# Patient Record
Sex: FEMALE | Race: AMERICAN INDIAN OR ALASKA NATIVE
[De-identification: names, ages, dates, MRNs, and addresses within clinical notes are randomized per-mention and may not be internally consistent; named-entity substitution may affect disease eponyms.]

---

## 2017-03-26 ENCOUNTER — HOSPITAL ENCOUNTER (OUTPATIENT)
Dept: HOSPITAL 17 - NEPC | Age: 52
Setting detail: OBSERVATION
Discharge: HOME | End: 2017-03-26
Attending: INTERNAL MEDICINE | Admitting: INTERNAL MEDICINE
Payer: COMMERCIAL

## 2017-03-26 VITALS
DIASTOLIC BLOOD PRESSURE: 81 MMHG | RESPIRATION RATE: 21 BRPM | HEART RATE: 85 BPM | SYSTOLIC BLOOD PRESSURE: 156 MMHG | TEMPERATURE: 97.9 F | OXYGEN SATURATION: 97 %

## 2017-03-26 VITALS — OXYGEN SATURATION: 95 %

## 2017-03-26 VITALS
HEART RATE: 68 BPM | OXYGEN SATURATION: 98 % | RESPIRATION RATE: 18 BRPM | SYSTOLIC BLOOD PRESSURE: 112 MMHG | DIASTOLIC BLOOD PRESSURE: 64 MMHG

## 2017-03-26 VITALS
RESPIRATION RATE: 20 BRPM | OXYGEN SATURATION: 95 % | SYSTOLIC BLOOD PRESSURE: 118 MMHG | HEART RATE: 71 BPM | DIASTOLIC BLOOD PRESSURE: 56 MMHG

## 2017-03-26 VITALS — OXYGEN SATURATION: 97 %

## 2017-03-26 VITALS — BODY MASS INDEX: 23.98 KG/M2 | HEIGHT: 62 IN | WEIGHT: 130.29 LBS

## 2017-03-26 DIAGNOSIS — R07.89: Primary | ICD-10-CM

## 2017-03-26 DIAGNOSIS — Z82.49: ICD-10-CM

## 2017-03-26 DIAGNOSIS — Z91.048: ICD-10-CM

## 2017-03-26 DIAGNOSIS — F41.9: ICD-10-CM

## 2017-03-26 DIAGNOSIS — Z91.09: ICD-10-CM

## 2017-03-26 DIAGNOSIS — E78.5: ICD-10-CM

## 2017-03-26 LAB
ALP SERPL-CCNC: 76 U/L (ref 45–117)
ALT SERPL-CCNC: 36 U/L (ref 10–53)
ANION GAP SERPL CALC-SCNC: 10 MEQ/L (ref 5–15)
APTT BLD: 28.2 SEC (ref 24.3–30.1)
AST SERPL-CCNC: 27 U/L (ref 15–37)
BASOPHILS # BLD AUTO: 0 TH/MM3 (ref 0–0.2)
BASOPHILS NFR BLD: 0.3 % (ref 0–2)
BILIRUB SERPL-MCNC: 0.4 MG/DL (ref 0.2–1)
BUN SERPL-MCNC: 13 MG/DL (ref 7–18)
CHLORIDE SERPL-SCNC: 102 MEQ/L (ref 98–107)
CK SERPL-CCNC: 64 U/L (ref 26–192)
CK SERPL-CCNC: 79 U/L (ref 26–192)
EOSINOPHIL # BLD: 0.1 TH/MM3 (ref 0–0.4)
EOSINOPHIL NFR BLD: 0.8 % (ref 0–4)
ERYTHROCYTE [DISTWIDTH] IN BLOOD BY AUTOMATED COUNT: 14.2 % (ref 11.6–17.2)
GFR SERPLBLD BASED ON 1.73 SQ M-ARVRAT: 77 ML/MIN (ref 89–?)
HCO3 BLD-SCNC: 28.2 MEQ/L (ref 21–32)
HCT VFR BLD CALC: 42.4 % (ref 35–46)
HEMO FLAGS: (no result)
INR PPP: 0.9 RATIO
LYMPHOCYTES # BLD AUTO: 4.6 TH/MM3 (ref 1–4.8)
LYMPHOCYTES NFR BLD AUTO: 51.3 % (ref 9–44)
MCH RBC QN AUTO: 28.4 PG (ref 27–34)
MCHC RBC AUTO-ENTMCNC: 33.4 % (ref 32–36)
MCV RBC AUTO: 85.1 FL (ref 80–100)
MONOCYTES NFR BLD: 5.8 % (ref 0–8)
NEUTROPHILS # BLD AUTO: 3.8 TH/MM3 (ref 1.8–7.7)
NEUTROPHILS NFR BLD AUTO: 41.8 % (ref 16–70)
PLATELET # BLD: 198 TH/MM3 (ref 150–450)
POTASSIUM SERPL-SCNC: 3.4 MEQ/L (ref 3.5–5.1)
PROTHROMBIN TIME: 10.2 SEC (ref 9.8–11.6)
RBC # BLD AUTO: 4.98 MIL/MM3 (ref 4–5.3)
SODIUM SERPL-SCNC: 140 MEQ/L (ref 136–145)
WBC # BLD AUTO: 9 TH/MM3 (ref 4–11)

## 2017-03-26 PROCEDURE — 93005 ELECTROCARDIOGRAM TRACING: CPT

## 2017-03-26 PROCEDURE — 80053 COMPREHEN METABOLIC PANEL: CPT

## 2017-03-26 PROCEDURE — 85730 THROMBOPLASTIN TIME PARTIAL: CPT

## 2017-03-26 PROCEDURE — 85610 PROTHROMBIN TIME: CPT

## 2017-03-26 PROCEDURE — 82550 ASSAY OF CK (CPK): CPT

## 2017-03-26 PROCEDURE — G0378 HOSPITAL OBSERVATION PER HR: HCPCS

## 2017-03-26 PROCEDURE — 71010: CPT

## 2017-03-26 PROCEDURE — 84484 ASSAY OF TROPONIN QUANT: CPT

## 2017-03-26 PROCEDURE — 85025 COMPLETE CBC W/AUTO DIFF WBC: CPT

## 2017-03-26 PROCEDURE — 93017 CV STRESS TEST TRACING ONLY: CPT

## 2017-03-26 NOTE — TR
Date Performed: 03/26/2017       Time Performed: 13:15:09

 

DOCTOR:      Marco A Mills 

 

DRUG LIST:     

CLINICAL HISTORY:     

REASON FOR TEST:      Chest pain

REASON FOR ENDING:     

OBSERVATION:     

CONCLUSION:      PT EXERCISED USING ZURDO PROTOCOL FOR Total Exercise Time=8:01 Maximum HA=686 % Max 
HR Qruhhgvh=607.0% Maximum JM=346/72. EXER MICHELLE VERY GOOD. TEST ENDED AS PT WELL EXCEEDED GOAL, COULD 
HAVE WALKED LONGER. NO CHEST PAIN. NORMAL BP RESPONSE. AT PEAK EXERCISE, RAPIDLY UPSLOPING ST-T SEGME
NTS WITHOUT EVIDENCE OF ISCHEMIA. NO ARRHYTHMIAS. RECOVERY UNREMARKABLE.

COMMENTS:      Conclusion: Normal treadmill exercise.  No evidence of ischemia.

## 2017-03-26 NOTE — EKG
Date Performed: 03/26/2017       Time Performed: 11:07:38

 

PTAGE:      51 years

 

EKG:      Sinus rhythm 

 

 NORMAL ECG Since 

 

 PREVIOUS TRACING            , no significant change noted

 

DOCTOR:   Marco A Mills  Interpretating Date/Time  03/26/2017 13:39:32

## 2017-03-26 NOTE — RADRPT
EXAM DATE/TIME:  03/26/2017 07:18 

 

[facility] COMPARISON:     

No previous studies available for comparison.

 

                     

INDICATIONS :     

Chest pain. 

                     

 

MEDICAL HISTORY :     

None.          

 

SURGICAL HISTORY :     

None.   

 

ENCOUNTER:     

Initial                                        

 

ACUITY:     

1 day      

 

PAIN SCORE:     

3/10

 

LOCATION:     

Bilateral chest 

 

FINDINGS:     

A single view of the chest demonstrates the lungs to be symmetrically aerated without evidence of mas
s, infiltrate or effusion.  The cardiomediastinal contours are unremarkable.  Osseous structures are 
intact.

 

CONCLUSION:     Normal examination.  

 

 

 

 Nohemy Whitten MD on March 26, 2017 at 7:23           

Board Certified Radiologist.

 This report was verified electronically.

## 2017-03-26 NOTE — EKG
Date Performed: 03/26/2017       Time Performed: 05:52:38

 

PTAGE:      51 years

 

EKG:      Sinus rhythm 

 

 Since previous tracing, no significant change noted NORMAL ECG

 

PREVIOUS TRACING       : 12/19/2015 06.26

 

DOCTOR:   Azael Pratt  Interpretating Date/Time  03/26/2017 14:55:17

## 2017-03-26 NOTE — HHI.DCPOC
Discharge Care Plan


Diagnosis:  


(1) Atypical chest pain


Goals to Promote Your Health


* To prevent worsening of your condition and complications


* To maintain your health at the optimal level


Directions to Meet Your Goals


*** Take your medications as prescribed


*** Follow your dietary instruction


*** Follow activity as directed








*** Keep your appointments as scheduled


*** Take your immunizations and boosters as scheduled


*** If your symptoms worsen call your PCP, if no PCP go to Urgent Care Center 

or Emergency Room***


*** Smoking is Dangerous to Your Health. Avoid second hand smoke***


***Call the 24-hour hour crisis hotline for domestic abuse at 1-268.976.5870***








Alena Karimi Mar 26, 2017 13:38

## 2017-03-26 NOTE — PD
HPI


Chief Complaint:  Chest Pain


Time Seen by Provider:  06:30


Travel History


International Travel<30 days:  No


Contact w/Intl Traveler<30days:  No


Traveled to known affect area:  No





History of Present Illness


HPI


51-year-old female complains of chest pain.  Patient was working this morning 

at [employer].  Patient started having substernal pressure which lasted 

about 5 seconds and resolved completely.  Patient states that she is 

experiencing the same symptoms again which last another 5 second very closely 

after the first episode.  Patient is chest pain-free now.  Patient denies any 

pain radiation.  Patient denies palpitation.  Patient denies any nausea or 

diaphoresis.  Patient denies any history of CAD.  Patient denies any history 

hypertension and diabetes.  Patient has history of dyslipidemia.  Patient is a 

nonsmoker.  Patient has family history of heart disease.  Patient states that 

she is under a lot of stress recently from recent loss of her .





PFSH


Past Medical History


Medical History:  Denies Significant Hx


Blood Disorders:  No


Cancer:  No


Cardiovascular Problems:  No


Diminished Hearing:  No


Endocrine:  No


Genitourinary:  No


Immune Disorder:  No


Musculoskeletal:  No


Neurologic:  No


Psychiatric:  No


Reproductive:  No


Respiratory:  No


Pregnant?:  Not Pregnant


Menopausal:  Yes





Past Surgical History


Surgical History:  No Previous Surgery





Social History


Alcohol Use:  Yes (occasionaly )


Tobacco Use:  No


Substance Use:  No





Allergies-Medications


(Allergen,Severity, Reaction):  


Coded Allergies:  


     Poppy Seed (Verified  Allergy, Severe, HIVES, 6/6/13)


Uncoded Allergies:  


     PERFUME (Allergy, Intermediate, ITCHING, 7/11/09)


Reported Meds & Prescriptions





Reported Meds & Active Scripts


Active


No Active Prescriptions or Reported Medications    








Review of Systems


General / Constitutional:  No: Fever


Eyes:  No: Visual changes


HENT:  No: Headaches


Cardiovascular:  Positive: Chest Pain or Discomfort


Respiratory:  No: Shortness of Breath


Gastrointestinal:  No: Abdominal Pain


Genitourinary:  No: Dysuria


Musculoskeletal:  No: Pain


Skin:  No Rash


Neurologic:  No: Weakness


Psychiatric:  No: Depression


Endocrine:  No: Polydipsia


Hematologic/Lymphatic:  No: Easy Bruising





Physical Exam


Narrative


GENERAL: Well-nourished, well-developed patient.


SKIN: Focused skin assessment warm/dry.


HEAD: Normocephalic.


EYES: No scleral icterus. No injection or drainage. 


NECK: Supple, trachea midline. No JVD or lymphadenopathy.


CARDIOVASCULAR: Regular rate and rhythm without murmurs, gallops, or rubs. 


RESPIRATORY: Breath sounds equal bilaterally. No accessory muscle use.


GASTROINTESTINAL: Abdomen soft, non-tender, nondistended. 


MUSCULOSKELETAL: No cyanosis, or edema. 


BACK: Nontender without obvious deformity. No CVA tenderness. 


Neurologic exam normal.





Data


Data


Last Documented VS





Vital Signs








  Date Time  Temp Pulse Resp B/P Pulse Ox O2 Delivery O2 Flow Rate FiO2


 


3/26/17 05:45 97.9 85 21 156/81 97   











MDM


Medical Decision Making


Medical Screen Exam Complete:  Yes


Emergency Medical Condition:  Yes


Interpretation(s)


7:01 AM.  EKG shows sinus rhythm nonspecific ST-T wave change.


Differential Diagnosis


Differential diagnosis including mental stress, anxiety, angina, MI, PE, 

pneumothorax.


Narrative Course


51-year-old female with chest pain.


Scripts


No Active Prescriptions or Reported Meds








Gilbert Freeman MD Mar 26, 2017 07:02

## 2017-03-26 NOTE — PD
Physical Exam


Date Seen by Provider:  Mar 26, 2017


Time Seen by Provider:  07:00


Narrative


Case is signed out to me by Dr. Freeman, please see previous note for further 

details.  Here with intermittent chest pains.  Initial EKG unremarkable.








Laboratory Tests








Test 3/26/17





 07:10


 


Lymphocytes (%) (Auto) 51.3 %





 (9.0-44.0)


 


Potassium Level 3.4 MEQ/L





 (3.5-5.1)


 


Estimat Glomerular Filtration 77 ML/MIN (>89)





Rate 


 


Troponin I LESS THAN 0.02





 NG/ML





 (0.02-0.05)








Last 24 hours Impressions








Chest X-Ray 3/26/17 0657 Signed





Impressions: 





 Service Date/Time:  Sunday, March 26, 2017 07:18 - CONCLUSION: Normal 





 examination.       Nohemy Whitten MD 











At this point, my plan would be to admit the patient for further evaluation of 

chest pain in the chest pain center.





Data


Data


Last Documented VS





Vital Signs








  Date Time  Temp Pulse Resp B/P Pulse Ox O2 Delivery O2 Flow Rate FiO2


 


3/26/17 05:45 97.9 85 21 156/81 97   








Orders





 Complete Blood Count With Diff (3/26/17 06:57)


Comprehensive Metabolic Panel (3/26/17 06:57)


Creatine Kinase (Cpk) (3/26/17 06:57)


Troponin I (3/26/17 06:57)


Prothrombin Time / Inr (Pt) (3/26/17 06:57)


Act Partial Throm Time (Ptt) (3/26/17 06:57)


Chest, Single Ap (3/26/17 06:57)


Iv Access Insert/Monitor (3/26/17 06:57)


Ecg Monitoring (3/26/17 06:57)


Oximetry (3/26/17 06:57)


Electrocardiogram (3/26/17 05:52)





Labs





 Laboratory Tests








Test 3/26/17





 07:10


 


White Blood Count 9.0 TH/MM3


 


Red Blood Count 4.98 MIL/MM3


 


Hemoglobin 14.1 GM/DL


 


Hematocrit 42.4 %


 


Mean Corpuscular Volume 85.1 FL


 


Mean Corpuscular Hemoglobin 28.4 PG


 


Mean Corpuscular Hemoglobin 33.4 %





Concent 


 


Red Cell Distribution Width 14.2 %


 


Platelet Count 198 TH/MM3


 


Mean Platelet Volume 9.0 FL


 


Neutrophils (%) (Auto) 41.8 %


 


Lymphocytes (%) (Auto) 51.3 %


 


Monocytes (%) (Auto) 5.8 %


 


Eosinophils (%) (Auto) 0.8 %


 


Basophils (%) (Auto) 0.3 %


 


Neutrophils # (Auto) 3.8 TH/MM3


 


Lymphocytes # (Auto) 4.6 TH/MM3


 


Monocytes # (Auto) 0.5 TH/MM3


 


Eosinophils # (Auto) 0.1 TH/MM3


 


Basophils # (Auto) 0.0 TH/MM3


 


CBC Comment DIFF FINAL 


 


Differential Comment  


 


Prothrombin Time 10.2 SEC


 


Prothromb Time International 0.9 RATIO





Ratio 


 


Activated Partial 28.2 SEC





Thromboplast Time 


 


Sodium Level 140 MEQ/L


 


Potassium Level 3.4 MEQ/L


 


Chloride Level 102 MEQ/L


 


Carbon Dioxide Level 28.2 MEQ/L


 


Anion Gap 10 MEQ/L


 


Blood Urea Nitrogen 13 MG/DL


 


Creatinine 0.78 MG/DL


 


Estimat Glomerular Filtration 77 ML/MIN





Rate 


 


Random Glucose 98 MG/DL


 


Calcium Level 9.1 MG/DL


 


Total Bilirubin 0.4 MG/DL


 


Aspartate Amino Transf 27 U/L





(AST/SGOT) 


 


Alanine Aminotransferase 36 U/L





(ALT/SGPT) 


 


Alkaline Phosphatase 76 U/L


 


Total Creatine Kinase 79 U/L


 


Troponin I LESS THAN 0.02





 NG/ML


 


Total Protein 7.6 GM/DL


 


Albumin 3.7 GM/DL











MDM


Medical Record Reviewed:  Yes


Supervised Visit with SIOBHAN:  No


Diagnosis





 Primary Impression:  


 Atypical chest pain





Admitting Information


Admitting Physician Requests:  Admit


Scripts


No Active Prescriptions or Reported Meds








Reena Alexis MD Mar 26, 2017 09:13

## 2017-03-27 NOTE — MH
cc:

KAILA ROMAN MD, DAVID W. M.D.

****

 

DATE OF ADMISSION:  3/26/2017

 

YOB: 1965

 

CHIEF COMPLAINT

Chest pain.

 

HISTORY OF PRESENT ILLNESS

The patient is a very pleasant 51-year-old female who actually works here at

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  She was working early this

morning and at a patient's bedside drawing blood work, when she developed a

sudden onset of a midsternal pressure that she describes as a squeezing

sensation similar to that of a blood pressure cuff squeezing.  She rates it as

an 8/10 on a 0 to 10 pain scale.  She had some dryness of her mouth but no

associated nausea, vomiting, diaphoresis or dyspnea.  There was no radiation of

pain.  She states the discomfort was intense lasted only seconds and then

resolved for a minute or two.  She was still at the bedside attempting to

redraw on the patient when she again had a second episode much like the first

again, lasting for a few seconds.  After finishing at the bedside she came out

and discussed this with the nurse on the unit and was brought immediately down

to the emergency room for further evaluation.

 

She states while in the ED she only had a little bit of dull discomfort that

lasted a couple of minutes that she rates as a 2/10, that went away and she has

had no discomfort since.  She was feeling fine.  She does state that when the

nurses checked her blood pressure after exiting the patient's room, it was

elevated at 181/79 which is not her usual.  She states she has no history of

hypertension normally.

 

She could not relate the discomfort to food or GERD sensation.  She has been

under a great deal of emotional stress over the last few months.  Her 

who was gravely ill passed away in the hospital approximately five months ago

and she has been working her way through the grief process and having some

anxiety and depression symptoms.  She has been seen in the office by her

primary care physician for follow up.  She has been following with counselors

here in the hospital, Austin Jennings, as well as [facility] and she

states that has been a great comfort and a good source of coping for her.  She

states she does very well with the counseling along without medications as long

as she goes on a routine basis every couple of weeks.  She does have good

family support but a lot of family lives in [facility] but she has good support by

phone.  She does have a cousin that lives her locally and her daughter, and new

grand baby who she just visited out of town.

 

She uses an occasional Xanax when she has severe stress or panic attack but she

states that she has not had to use any of those recently, maybe two in the last

few weeks.  She does endorse that yesterday different from her usual routine,

she took a nap for the first time since her  has passed and then was

unable to sleep last night prior to coming to work.  She does endorse that she

was upset about this and since she could not sleep decided to write her 

a letter and express some of her feelings.  Normally she states she gets a

decent amount of sleep prior to coming to work.

 

PAST MEDICAL HISTORY:

1. Mild dyslipidemia with a good HDL cholesterol of 64, slightly elevated

hemoglobin A1c of 6.1%, diet controlled.

2. Prediabetic.

3. No history of hypertension.

4. Cardiac murmur that has been evaluated through the office with an echo.

5. Colonoscopy.

6. Bone spur to her toe.

7. Carpal tunnel syndrome.

8. Some mild arthritis symptoms.

9. Surgery for BTL in 2005.

10. Situational anxiety and grief with some depression regarding her 's

loss, approximately five months ago, which she has been dealing well with

through counseling and p.r.n. use of medication.

 

SOCIAL HISTORY:

Recently  after several years of marriage.  She is a lifetime nonsmoker.

She drinks a very rare alcoholic beverages.  No illicit drug use.  She is very

active at work, does a lot of walking.

 

FAMILY HISTORY

Positive for heart disease.

 

PAST CARDIAC TESTING

No recent cardiac testing as far as stress testing.

 

MEDICATIONS

1. Alprazolam 0.25 mg tablets, she uses a half to 1 tablet p.o. daily p.r.n.

   anxiety, uses this very rarely, otherwise no medications are listed.

 

ALLERGIES

PERFUME AND POPPY SEED.

 

No medication allergies.

 

REVIEW OF SYSTEMS

Positive for what is discussed above in HPI, otherwise negative system review

10-point.

 

PHYSICAL EXAMINATION

Vital signs: On arrival to ED temperature 97.9, heart rate 85, respiratory rate

21, blood pressure 156/81, O2 saturations 97% on room air.

At time of assessment in the ER blood pressure was normal range 112/64.

Generally, very pleasant 51-year-old female lying quietly in the ER, friendly,

alert, oriented x3 well-nourished, well-developed and in no acute distress

currently chest pain free.

Head is atraumatic, normocephalic.

Eyes:  Sclerae clear, nonicteric.  EOMI.

Neck:  Supple.  Trachea is midline.  No JVD right no carotid bruit, left very

faint left carotid bruit.

CV:  S1-S2, RRR.  No S3-S4 rub, gallop.  A faint grade 1/6 systolic murmur

loudest at the apex.

Respiratory:  CTA - B.  No wheezes, rales or rhonchi.

GI:  Abdomen is soft, nontender on exam.

Extremities:  Moves all extremities well.  There is no lower leg edema.  Pulses

are 2+ throughout.

Neuro:  CN II-XII grossly intact.  Motor and sensory is grossly normal.

Psych:  She is very pleasant, friendly, does get mildly tearful and anxious

when discussing her  and her grief process over the last few months.

Otherwise her insight and judgment appear intact.

 

LABORATORY DATA

CBC, lymphocyte percentage 51.3 otherwise unremarkable.  Coag profile

unremarkable.  Complete chemistry, potassium 3.4, GFR 77 otherwise

unremarkable.  This includes normal liver function tests.

 

Cardiac enzymes showed two sets of troponins and CKs which are normal range.

 

IMAGING STUDIES

Chest film showing normal examination.

 

First EKG showing normal sinus rhythm.  Second set is not available at time of

dictation for review.

 

ASSESSMENT/PLAN

Chest pain, somewhat atypical in nature in a patient with some risk factor for

heart disease.  She had initial assessment in the ER.  Two episodes of chest

discomfort as described lasting seconds.  Two sets of negative cardiac enzymes,

first EKG normal and will assess the second.

 

She will come over to the chest pain center for observation and be seen by Dr. Kaila Roman in cardiology as well.  Suspect as she is very active that if

her second EKG is normal likely proceed with a Gurpreet protocol exercise

treadmill test for further cardiac evaluation as the onset of her discomfort

was approximately 5 hours prior to second enzymes.

 

If the plan should differ, further recommendations will follow.

 

 

Dictated by: MICHAEL Pascual

 

 

 

                               __________________________________

                           Kaila Roman MD

 

 

 

 

[facility]

D:  3/26/2017/11:29 AM

T:  3/27/2017/8:28 AM

Visit #:  [id number]

Job #:  [id number]

## 2017-03-28 VITALS — OXYGEN SATURATION: 95 %

## 2017-05-03 ENCOUNTER — HOSPITAL ENCOUNTER (OUTPATIENT)
Dept: HOSPITAL 17 - CLAB | Age: 52
End: 2017-05-03
Attending: NURSE PRACTITIONER
Payer: COMMERCIAL

## 2017-05-03 DIAGNOSIS — E78.5: ICD-10-CM

## 2017-05-03 DIAGNOSIS — M79.1: Primary | ICD-10-CM

## 2017-05-03 DIAGNOSIS — Z51.81: ICD-10-CM

## 2017-05-03 LAB
ALP SERPL-CCNC: 69 U/L (ref 45–117)
ALT SERPL-CCNC: 20 U/L (ref 10–53)
ANION GAP SERPL CALC-SCNC: 8 MEQ/L (ref 5–15)
AST SERPL-CCNC: 18 U/L (ref 15–37)
BASOPHILS # BLD AUTO: 0 TH/MM3 (ref 0–0.2)
BASOPHILS NFR BLD: 0.4 % (ref 0–2)
BILIRUB SERPL-MCNC: 0.5 MG/DL (ref 0.2–1)
BUN SERPL-MCNC: 16 MG/DL (ref 7–18)
CHLORIDE SERPL-SCNC: 104 MEQ/L (ref 98–107)
EOSINOPHIL # BLD: 0 TH/MM3 (ref 0–0.4)
EOSINOPHIL NFR BLD: 0.6 % (ref 0–4)
ERYTHROCYTE [DISTWIDTH] IN BLOOD BY AUTOMATED COUNT: 13.9 % (ref 11.6–17.2)
GFR SERPLBLD BASED ON 1.73 SQ M-ARVRAT: 72 ML/MIN (ref 89–?)
HCO3 BLD-SCNC: 28 MEQ/L (ref 21–32)
HCT VFR BLD CALC: 41.2 % (ref 35–46)
HDLC SERPL-MCNC: 65 MG/DL (ref 40–60)
HEMO FLAGS: (no result)
HEMOGLOBIN A1A: 1.2 %
HEMOGLOBIN A1B: 1.5 %
HEMOGLOBIN AO: 85.3 %
HEMOGLOBIN LA1C: 1.9 %
HEMOGLOBIN P3: 3.5 %
LDLC SERPL-MCNC: 156 MG/DL (ref 0–99)
LYMPHOCYTES # BLD AUTO: 3 TH/MM3 (ref 1–4.8)
LYMPHOCYTES NFR BLD AUTO: 37 % (ref 9–44)
MAGNESIUM SERPL-MCNC: 2.2 MG/DL (ref 1.5–2.5)
MCH RBC QN AUTO: 27.2 PG (ref 27–34)
MCHC RBC AUTO-ENTMCNC: 32 % (ref 32–36)
MCV RBC AUTO: 84.9 FL (ref 80–100)
MONOCYTES NFR BLD: 4.8 % (ref 0–8)
NEUTROPHILS # BLD AUTO: 4.7 TH/MM3 (ref 1.8–7.7)
NEUTROPHILS NFR BLD AUTO: 57.2 % (ref 16–70)
PLATELET # BLD: 196 TH/MM3 (ref 150–450)
POTASSIUM SERPL-SCNC: 4.1 MEQ/L (ref 3.5–5.1)
RBC # BLD AUTO: 4.85 MIL/MM3 (ref 4–5.3)
SODIUM SERPL-SCNC: 140 MEQ/L (ref 136–145)
WBC # BLD AUTO: 8.2 TH/MM3 (ref 4–11)

## 2017-05-03 PROCEDURE — 83735 ASSAY OF MAGNESIUM: CPT

## 2017-05-03 PROCEDURE — 83036 HEMOGLOBIN GLYCOSYLATED A1C: CPT

## 2017-05-03 PROCEDURE — 85025 COMPLETE CBC W/AUTO DIFF WBC: CPT

## 2017-05-03 PROCEDURE — 36415 COLL VENOUS BLD VENIPUNCTURE: CPT

## 2017-05-03 PROCEDURE — 80053 COMPREHEN METABOLIC PANEL: CPT

## 2017-05-03 PROCEDURE — 80061 LIPID PANEL: CPT

## 2017-05-03 PROCEDURE — 84443 ASSAY THYROID STIM HORMONE: CPT

## 2017-10-18 ENCOUNTER — HOSPITAL ENCOUNTER (EMERGENCY)
Dept: HOSPITAL 17 - NEPD | Age: 52
Discharge: HOME | End: 2017-10-18
Payer: COMMERCIAL

## 2017-10-18 VITALS
TEMPERATURE: 98.3 F | HEART RATE: 80 BPM | DIASTOLIC BLOOD PRESSURE: 86 MMHG | SYSTOLIC BLOOD PRESSURE: 135 MMHG | OXYGEN SATURATION: 99 % | RESPIRATION RATE: 16 BRPM

## 2017-10-18 VITALS — WEIGHT: 132.28 LBS | BODY MASS INDEX: 24.34 KG/M2 | HEIGHT: 62 IN

## 2017-10-18 DIAGNOSIS — S61.031A: Primary | ICD-10-CM
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
## 2017-10-18 PROCEDURE — 99281 EMR DPT VST MAYX REQ PHY/QHP: CPT

## 2017-10-18 NOTE — PD
HPI


Chief Complaint:  Exposure to Blood/Body Fluids


Time Seen by Provider:  12:19


Travel History


International Travel<30 days:  Yes


Contact w/Intl Traveler<30days:  Yes


Name of Country Traveled to:  [facility]


Traveled to known affect area:  No





History of Present Illness


HPI


52-year-old female here for evaluation of a contaminated needle exposure.  

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 

safety cover over of a hollow bore needle when the needle punctured her right 

thumb.  She milked the area washed and immediately.  The source patient is 

currently a patient in the [facility].  She denies any history of HIV, hepatitis.





PFSH


Past Medical History


Medical History:  Denies Significant Hx


Blood Disorders:  No


Cancer:  No


Cardiovascular Problems:  No


Diminished Hearing:  No


Endocrine:  No


Genitourinary:  No


Immune Disorder:  No


Musculoskeletal:  No


Neurologic:  No


Psychiatric:  No


Reproductive:  No


Respiratory:  No


Tetanus Vaccination:  < 5 Years


Pregnant?:  Not Pregnant


Menopausal:  Yes





Past Surgical History


Surgical History:  No Previous Surgery





Social History


Alcohol Use:  Yes (occasionaly )


Tobacco Use:  No


Substance Use:  No





Allergies-Medications


(Allergen,Severity, Reaction):  


Coded Allergies:  


     poppyseed oil (Unverified  Allergy, Severe, HIVES, 8/15/17)


Uncoded Allergies:  


     PERFUME (Allergy, Intermediate, ITCHING, 7/11/09)


Reported Meds & Prescriptions





Reported Meds & Active Scripts


Active


No Active Prescriptions or Reported Medications    








Review of Systems


Except as stated in HPI:  all other systems reviewed are Neg





Physical Exam


Narrative


GENERAL: Well-nourished, well-developed patient.


SKIN: Focused skin assessment warm/dry.  Nonbleeding puncture wound right thumb.


HEAD: Normocephalic.


EYES: No scleral icterus. No injection or drainage. 


NECK: Supple, trachea midline. No JVD or lymphadenopathy.





Data


Data


Last Documented VS





Vital Signs








  Date Time  Temp Pulse Resp B/P (MAP) Pulse Ox O2 Delivery O2 Flow Rate FiO2


 


10/18/17 11:54 98.3 80 16 135/86 (102) 99 Room Air  








Orders





 Orders


Ed Discharge Order (10/18/17 13:45)








MDM


Medical Decision Making


Medical Screen Exam Complete:  Yes


Emergency Medical Condition:  Yes


Differential Diagnosis


Puncture wound right thumb, contaminated needlestick, body fluid exposure


Narrative Course


52-year-old female here after she sustained a contaminated needle stick to her 

right thumb.  The source patient is known.  Per protocol OSHA panel was drawn 

on patient and rapid HIV screening pending on source patient.





Source patient negative for HIV





Patient is to follow-up with employee med.  She verbalizes understanding and 

agrees to plan





Diagnosis





 Primary Impression:  


 Needle stick injury of finger


 Qualified Codes:  S61.239A - Puncture wound without foreign body of 

unspecified finger without damage to nail, initial encounter; W27.3XXA - 

Contact with needle (sewing), initial encounter


Referrals:  


Employ Med





***Additional Instructions:  


Follow-up with employee med


Scripts


No Active Prescriptions or Reported Meds


Disposition:  01 DISCHARGE HOME


Condition:  Stable











Nadiya Crowell Oct 18, 2017 13:18

## 2017-12-18 ENCOUNTER — HOSPITAL ENCOUNTER (EMERGENCY)
Dept: HOSPITAL 17 - NEPD | Age: 52
Discharge: HOME | End: 2017-12-18
Payer: COMMERCIAL

## 2017-12-18 VITALS
HEART RATE: 74 BPM | RESPIRATION RATE: 16 BRPM | SYSTOLIC BLOOD PRESSURE: 132 MMHG | TEMPERATURE: 97.4 F | OXYGEN SATURATION: 98 % | DIASTOLIC BLOOD PRESSURE: 63 MMHG

## 2017-12-18 VITALS — WEIGHT: 123.46 LBS | HEIGHT: 63 IN | BODY MASS INDEX: 21.88 KG/M2

## 2017-12-18 DIAGNOSIS — M54.2: ICD-10-CM

## 2017-12-18 DIAGNOSIS — R51: Primary | ICD-10-CM

## 2017-12-18 PROCEDURE — 99283 EMERGENCY DEPT VISIT LOW MDM: CPT
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
## 2018-01-04 ENCOUNTER — HOSPITAL ENCOUNTER (OUTPATIENT)
Dept: HOSPITAL 17 - CLAB | Age: 53
End: 2018-01-04
Attending: FAMILY MEDICINE
Payer: COMMERCIAL

## 2018-01-04 DIAGNOSIS — N39.42: ICD-10-CM

## 2018-01-04 DIAGNOSIS — Z51.81: ICD-10-CM

## 2018-01-04 DIAGNOSIS — N39.3: ICD-10-CM

## 2018-01-04 DIAGNOSIS — R63.4: ICD-10-CM

## 2018-01-04 DIAGNOSIS — E78.5: Primary | ICD-10-CM

## 2018-01-04 LAB
ALBUMIN SERPL-MCNC: 3.9 GM/DL (ref 3.4–5)
ALP SERPL-CCNC: 71 U/L (ref 45–117)
ALT SERPL-CCNC: 40 U/L (ref 10–53)
AST SERPL-CCNC: 33 U/L (ref 15–37)
BACTERIA #/AREA URNS HPF: (no result) /HPF
BILIRUB SERPL-MCNC: 0.6 MG/DL (ref 0.2–1)
BUN SERPL-MCNC: 13 MG/DL (ref 7–18)
CALCIUM SERPL-MCNC: 9.4 MG/DL (ref 8.5–10.1)
CHLORIDE SERPL-SCNC: 102 MEQ/L (ref 98–107)
CHOLEST SERPL-MCNC: 297 MG/DL (ref 120–200)
CHOLESTEROL/ HDL RATIO: 3.71 RATIO
COLOR UR: YELLOW
CREAT SERPL-MCNC: 0.84 MG/DL (ref 0.5–1)
GFR SERPLBLD BASED ON 1.73 SQ M-ARVRAT: 71 ML/MIN (ref 89–?)
GLUCOSE UR STRIP-MCNC: (no result) MG/DL
HCO3 BLD-SCNC: 28.3 MEQ/L (ref 21–32)
HDLC SERPL-MCNC: 80 MG/DL (ref 40–60)
HGB UR QL STRIP: (no result)
KETONES UR STRIP-MCNC: (no result) MG/DL
LDLC SERPL DIRECT ASSAY-MCNC: 181 MG/DL (ref 0–99)
LDLC SERPL-MCNC: 202 MG/DL (ref 0–99)
MUCOUS THREADS #/AREA URNS LPF: (no result) /LPF
NITRITE UR QL STRIP: (no result)
PROT SERPL-MCNC: 7.9 GM/DL (ref 6.4–8.2)
SODIUM SERPL-SCNC: 138 MEQ/L (ref 136–145)
SP GR UR STRIP: 1.02 (ref 1–1.03)
SQUAMOUS #/AREA URNS HPF: <1 /HPF (ref 0–5)
TRIGL SERPL-MCNC: 76 MG/DL (ref 42–150)
URINE LEUKOCYTE ESTERASE: (no result)

## 2018-01-04 PROCEDURE — 80053 COMPREHEN METABOLIC PANEL: CPT

## 2018-01-04 PROCEDURE — 84443 ASSAY THYROID STIM HORMONE: CPT

## 2018-01-04 PROCEDURE — 36415 COLL VENOUS BLD VENIPUNCTURE: CPT

## 2018-01-04 PROCEDURE — 80061 LIPID PANEL: CPT

## 2018-01-04 PROCEDURE — 81001 URINALYSIS AUTO W/SCOPE: CPT

## 2018-01-04 PROCEDURE — 83721 ASSAY OF BLOOD LIPOPROTEIN: CPT

## 2018-05-04 ENCOUNTER — HOSPITAL ENCOUNTER (EMERGENCY)
Dept: HOSPITAL 17 - NEPD | Age: 53
Discharge: HOME | End: 2018-05-04
Payer: COMMERCIAL

## 2018-05-04 VITALS
RESPIRATION RATE: 15 BRPM | DIASTOLIC BLOOD PRESSURE: 71 MMHG | HEART RATE: 70 BPM | OXYGEN SATURATION: 97 % | TEMPERATURE: 97.8 F | SYSTOLIC BLOOD PRESSURE: 135 MMHG
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
## 2018-05-04 DIAGNOSIS — S60.812A: Primary | ICD-10-CM

## 2018-05-04 DIAGNOSIS — F41.9: ICD-10-CM

## 2018-05-04 DIAGNOSIS — Z77.21: ICD-10-CM

## 2018-05-04 PROCEDURE — 99281 EMR DPT VST MAYX REQ PHY/QHP: CPT
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
## 2018-06-16 ENCOUNTER — HOSPITAL ENCOUNTER (EMERGENCY)
Dept: HOSPITAL 17 - PHEFT | Age: 53
Discharge: HOME | End: 2018-06-16
Payer: COMMERCIAL

## 2018-06-16 VITALS
DIASTOLIC BLOOD PRESSURE: 67 MMHG | OXYGEN SATURATION: 96 % | SYSTOLIC BLOOD PRESSURE: 145 MMHG | HEART RATE: 74 BPM | RESPIRATION RATE: 16 BRPM | TEMPERATURE: 98.7 F

## 2018-06-16 VITALS — WEIGHT: 154.32 LBS | BODY MASS INDEX: 26.35 KG/M2 | HEIGHT: 64 IN
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
## 2018-06-16 DIAGNOSIS — S63.501A: ICD-10-CM

## 2018-06-16 DIAGNOSIS — Z79.899: ICD-10-CM

## 2018-06-16 DIAGNOSIS — S20.212A: Primary | ICD-10-CM

## 2018-06-16 DIAGNOSIS — F41.9: ICD-10-CM

## 2018-06-16 PROCEDURE — 71045 X-RAY EXAM CHEST 1 VIEW: CPT

## 2018-06-16 PROCEDURE — 73070 X-RAY EXAM OF ELBOW: CPT

## 2018-06-16 PROCEDURE — 99284 EMERGENCY DEPT VISIT MOD MDM: CPT

## 2018-06-16 PROCEDURE — 73110 X-RAY EXAM OF WRIST: CPT
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
